# Patient Record
Sex: FEMALE | Race: WHITE
[De-identification: names, ages, dates, MRNs, and addresses within clinical notes are randomized per-mention and may not be internally consistent; named-entity substitution may affect disease eponyms.]

---

## 2019-07-12 ENCOUNTER — HOSPITAL ENCOUNTER (OUTPATIENT)
Dept: HOSPITAL 88 - OR | Age: 44
Discharge: HOME | End: 2019-07-12
Attending: INTERNAL MEDICINE
Payer: COMMERCIAL

## 2019-07-12 VITALS — SYSTOLIC BLOOD PRESSURE: 102 MMHG | DIASTOLIC BLOOD PRESSURE: 68 MMHG

## 2019-07-12 DIAGNOSIS — K63.5: ICD-10-CM

## 2019-07-12 DIAGNOSIS — K29.50: Primary | ICD-10-CM

## 2019-07-12 DIAGNOSIS — K20.9: ICD-10-CM

## 2019-07-12 DIAGNOSIS — F41.9: ICD-10-CM

## 2019-07-12 DIAGNOSIS — Z88.1: ICD-10-CM

## 2019-07-12 DIAGNOSIS — K62.89: ICD-10-CM

## 2019-07-12 DIAGNOSIS — K64.8: ICD-10-CM

## 2019-07-12 DIAGNOSIS — K51.50: ICD-10-CM

## 2019-07-12 DIAGNOSIS — K21.9: ICD-10-CM

## 2019-07-12 LAB — LACTOFERRIN STL QL: NEGATIVE

## 2019-07-12 PROCEDURE — 45380 COLONOSCOPY AND BIOPSY: CPT

## 2019-07-12 PROCEDURE — 87328 CRYPTOSPORIDIUM AG IA: CPT

## 2019-07-12 PROCEDURE — 87045 FECES CULTURE AEROBIC BACT: CPT

## 2019-07-12 PROCEDURE — 87177 OVA AND PARASITES SMEARS: CPT

## 2019-07-12 PROCEDURE — 43239 EGD BIOPSY SINGLE/MULTIPLE: CPT

## 2019-07-12 PROCEDURE — 84702 CHORIONIC GONADOTROPIN TEST: CPT

## 2019-07-12 PROCEDURE — 36415 COLL VENOUS BLD VENIPUNCTURE: CPT

## 2019-07-12 PROCEDURE — 83993 ASSAY FOR CALPROTECTIN FECAL: CPT

## 2019-07-12 PROCEDURE — 83630 LACTOFERRIN FECAL (QUAL): CPT

## 2019-07-12 PROCEDURE — 87493 C DIFF AMPLIFIED PROBE: CPT

## 2019-07-12 NOTE — XMS REPORT
Clinical Summary

                             Created on: 2019



Tiff Mcclellan

External Reference #: [id number]

: 1975

Sex: Female



Demographics







                          Address                   [address] [place] [address]  [address]

 

                          Home Phone                [phone]

 

                          Preferred Language        English

 

                          Marital Status            

 

                          Zoroastrian Affiliation     Unknown

 

                          Race                      White

 

                          Ethnic Group              Non-





Author







                          Author                    [facility]

 

                          Organization              [facility]

 

                          Address                   Unknown

 

                          Phone                     Unavailable







Support







                Name            Relationship    Address         Phone

 

                Contact,No      [facility]            Unknown         [phone]

 

                Scott Villavicencio    [facility]            Unknown         [phone]







Care Team Providers







                    Care Team Member Name    Role                Phone

 

                    Asked, No Pcp       PCP                 Unavailable







Allergies







                                        Comments



                 Active Allergy     Reactions       Severity        Noted Date 

 

                                         



                     Penicillins         Hives               2018 

 

                                         



                     Ceftriaxone         Hives               2018 







Medications







                          End Date                  Status



              Medication     Sig          Dispensed     Refills      Start  



                                         Date  

 

                                                    Active



                 dicyclomine (BENTYL) 20     TK 1 T PO TID      1                 



                           mg tablet                 8  

 

                                                    Active



                 pantoprazole (PROTONIX)     TK 1 T PO QAM      2                 



                           40 MG EC tablet           8  

 

                                                    Active



                     mesalamine (LIALDA) 1.2     Take 1,200 mg       0   



                           gram EC tablet            by mouth     



                                         daily with     



                                         breakfast.     

 

                          2019                Active



              ibuprofen (ADVIL,MOTRIN)     Take 1 tablet     30 tablet     0              



                     600 MG tablet       (600 mg             9  



                                         total) by     



                                         mouth every 8     



                                         (eight) hours     



                                         as needed for     



                                         moderate pain     



                                         for up to 30     



                                         days.     

 

                          2019                Active



              cyclobenzaprine     Take 1 tablet     20 tablet     0              



                     (FLEXERIL) 5 mg tablet     (5 mg total)        9  



                                         by mouth 2     



                                         (two) times a     



                                         day as needed     



                                         for muscle     



                                         spasms for up     



                                         to 30 days.     







Active Problems





Not on file



Encounters







                          Care Team                 Description



                     Date                Type                Specialty  

 

                                        



David Black Jr., MD    Acute left-sided low back pain without sciatica

 (Primary Dx)



                     2019          Emergency           Emergency Medicine  



                                         -    



                                         2019    



after 2018



Family History







   



                 Medical History     Relation        Name            Comments

 

   



                           Heart disease             Mother  









   



                 Relation        Name            Status          Comments

 

   



                           Father                    Alive 

 

   



                           Mother                     







Social History







                                        Date



                 Tobacco Use     Types           Packs/Day       Years Used 

 

                                         



                                         Never Smoker    

 

    



                                         Smokeless Tobacco: Never   



                                         Used   









   



                 Alcohol Use     Drinks/Week     oz/Week         Comments

 

   



                                         Yes   









 



                           Sex Assigned at Birth     Date Recorded

 

 



                                         Not on file 









                                        Industry



                           Job Start Date            Occupation 

 

                                        Not on file



                           Not on file               Not on file 









                                        Travel End



                           Travel History            Travel Start 

 





                                         No recent travel history available.







Last Filed Vital Signs







                                        Time Taken



                           Vital Sign                Reading 

 

                                        2019  2:00 AM CDT



                           Blood Pressure            100/53 

 

                                        2019  2:00 AM CDT



                           Pulse                     55 

 

                                        2019  2:00 AM CDT



                           Temperature               36.6 C (97.8 F) 

 

                                        2019  2:00 AM CDT



                           Respiratory Rate          16 

 

                                        2019  2:00 AM CDT



                           Oxygen Saturation         94% 

 

                                        -



                           Inhaled Oxygen            - 



                                         Concentration  

 

                                        2019  6:21 PM CDT



                           Weight                    65.8 kg (145 lb) 

 

                                        2019  6:21 PM CDT



                           Height                    149.9 cm (4' 11") 

 

                                        2019  6:21 PM CDT



                           Body Mass Index           29.29 







Plan of Treatment







   



                 Health Maintenance     Due Date        Last Done       Comments

 

   



                           INFLUENZA VACCINE         2019  







Procedures







                                        Comments



                 Procedure Name     Priority        Date/Time       Associated Diagnosis 

 

                                        



Results for this procedure are in the results section.



                     CT ABDOMEN PELVIS W     STAT                2019  



                           CONTRAST                  9:14 PM CDT  

 

                                        



Results for this procedure are in the results section.



                     CT LUMBAR SPINE WO     STAT                2019  



                           CONTRAST                  9:14 PM CDT  

 

                                        



Results for this procedure are in the results section.



                     ESTIMATED GFR       STAT                2019  



                                         7:55 PM CDT  

 

                                        



Results for this procedure are in the results section.



                     LACTIC ACID LEVEL     Routine             2019  



                                         7:55 PM CDT  

 

                                        



Results for this procedure are in the results section.



                     LIPASE LEVEL        STAT                2019  



                                         7:55 PM CDT  

 

                                        



Results for this procedure are in the results section.



                     COMPREHENSIVE METABOLIC     STAT                2019  



                           PANEL                     7:55 PM CDT  

 

                                        



Results for this procedure are in the results section.



                     HC COMPLETE BLD COUNT     STAT                2019  



                           W/AUTO DIFF               7:55 PM CDT  

 

                                        



Results for this procedure are in the results section.



                     HCG QUALITATIVE, URINE     Routine             2019  



                           SCREEN                    7:07 PM CDT  

 

                                        



Results for this procedure are in the results section.



                     URINALYSIS SCREEN AND     Routine             2019  



                           MICROSCOPY, WITH REFLEX      7:07 PM CDT  



                                         TO CULTURE    



after 2018



Results

* CT Abdomen Pelvis W Contrast (2019  9:14 PM CDT)









                                         Specimen

 











 



                           Narrative                 Performed At

 

 



                           CT ABDOMEN PELVIS W CONTRAST     [facility]



                                         CLINICAL INDICATION: abdominal painL flank pain 



                                         TECHNIQUE:Multidetector CT imaging of the abdomen and pelvis was performed 





                                         following the intravenous administration of iodinated contrast with multiplanar

 



                                         reconstructions.CT imaging was performed with iterative reconstruction 



                                         technique and/or 



                                         automated exposure control to reduce radiation dose. 



                                         COMPARISON:None 



                                         FINDINGS: 



                                         LOWER THORAX:Clear. 



                                         LIVER:Normal. 



                                         BILIARY:Normal. 



                                         SPLEEN:Normal. 



                                         PANCREAS:Normal. 



                                         ADRENALS:Normal. 



                                         KIDNEYS:No mass or hydronephrosis. 



                                         GI:Large and small bowel are normal in caliber.There are no inflammatory

 



                                         changes.Appendix is visualized and appears normal. 



                                         VASCULAR:Unremarkable 



                                         LYMPH NODES:No enlarged lymph nodes in the abdomen or pelvis. 



                                         PELVIS:The urinary bladder is normal for degree of distention. The uterus is

 



                                         without abnormality.There is a 1.3 cm corpus luteal cyst in the left ovary.

 



                                         BONES:There are no acute osseous abnormalities. 



                                         OTHER:No ascites or pneumoperitoneum. 



                                         IMPRESSION: 



                                         No acute intra-abdominal abnormality is identified. 



                                         [id number] 









                                        Procedure Note

 

                                        



 Interface, Radiology Results  - 2019  9:25 PM CDT



CT ABDOMEN PELVIS W CONTRAST



CLINICAL INDICATION: abdominal pain  L flank pain



TECHNIQUE:  Multidetector CT imaging of the abdomen and pelvis was performed 
following the intravenous administration of iodinated contrast with multiplanar 
reconstructions.  CT imaging was performed with iterative reconstruction 
technique and/or 

automated exposure control to reduce radiation dose.



COMPARISON:  None



FINDINGS:



LOWER THORAX:  Clear.



LIVER:    Normal.



BILIARY:  Normal.

 

SPLEEN:  Normal.



PANCREAS:  Normal.



ADRENALS:  Normal. 



KIDNEYS:  No mass or hydronephrosis.



GI:  Large and small bowel are normal in caliber.  There are no inflammatory 
changes.  Appendix is visualized and appears normal.



VASCULAR:  Unremarkable



LYMPH NODES:  No enlarged lymph nodes in the abdomen or pelvis.



PELVIS:  The urinary bladder is normal for degree of distention. The uterus is 
without abnormality.  There is a 1.3 cm corpus luteal cyst in the left ovary.



BONES:  There are no acute osseous abnormalities.



OTHER:  No ascites or pneumoperitoneum.





IMPRESSION:



No acute intra-abdominal abnormality is identified.









[facility]-[id number]











   



                 Performing Organization     Address         City/State/Zipcode     Phone Number

 

   



                     [facility]          [address] [place] [address] 





* CT Lumbar Spine Wo Contrast (2019  9:14 PM CDT)









                                         Specimen

 











 



                           Narrative                 Performed At

 

 



                           EXAMINATION:CT LUMBAR SPINE WO CONTRAST      [facility]



                                         CLINICAL HISTORY:low back pain. 



                                         COMPARISON: None. 



                                         FINDINGS:: Noncontrast CT of the lumbar spine is interpreted. 



                                         CT imaging was performed with iterative reconstruction techniques and/or 



                                         automated exposure control to reduce radiation dose. 



                                         Lowest fully formed disc space is designated L5-S1. 



                                         The vertebral heights are preserved. No fracture or aggressive bone lesion is 



                                         seen. 



                                         L1-2: Unremarkable. 



                                         L2-3: Unremarkable. 



                                         L3-4: Unremarkable. 



                                         L4-5: Unremarkable. 



                                         L5-S1: Small Schmorl's node in the superior endplate of S1. Minimal disc bulge.

 



                                         No significant stenosis. 



                                         The paraspinous soft tissues are unremarkable. 



                                         IMPRESSION: 



                                         No acute abnormality of the lumbar spine is identified. 



                                         [facility]-[id number] 









                                        Procedure Note

 

                                        



Hm Interface, Radiology Results Incoming - 2019  9:22 PM CDT



EXAMINATION:  CT LUMBAR SPINE WO CONTRAST



CLINICAL HISTORY:  low back pain.







COMPARISON: None.



FINDINGS:: Noncontrast CT of the lumbar spine is interpreted.



CT imaging was performed with iterative reconstruction techniques and/or 
automated exposure control to reduce radiation dose.



Lowest fully formed disc space is designated L5-S1.



The vertebral heights are preserved. No fracture or aggressive bone lesion is 
seen.



L1-2: Unremarkable.



L2-3: Unremarkable.



L3-4: Unremarkable.



L4-5: Unremarkable.



L5-S1: Small Schmorl's node in the superior endplate of S1. Minimal disc bulge. 
No significant stenosis.



The paraspinous soft tissues are unremarkable.



IMPRESSION:



No acute abnormality of the lumbar spine is identified.











[facility]-[id number]









   



                 Performing Organization     Address         City/[facility]/Zipcode     Phone Number

 

   



                     [facility]          [address] [place] [address] 





* Estimated GFR (2019  7:55 PM CDT)





    



              Component     Value        Ref Range     Performed At     Pathologist



                                         Signature

 

    



                 Estimated GFR     >=90            mL/min/1.73 m2     [facility] 



                           Comment:                  [facility] 



                           [facility] 



                                         G1   



                                         >=90 Normal or high   



                                         G2   



                                         60-89Mildly decreased   



                                         [id number]   



                                         Mildly to moderately   



                                         decreased   



                                         J3s29-90   



                                         Moderately to severely   



                                         decreased   



                                         G4   



                                         15-29Severely   



                                         decreased   



                                         G5   



                                         <15Kidney failure   



                                         The eGFR was calculated using   



                                         the Chronic Kidney Disease   



                                         Epidemiology Collaboration   



                                         (CKD-EPI) equation.   



                                         Interpretation is based on   



                                         recommendations of the   



                                         National Kidney   



                                         Foundation-Kidney Disease   



                                         Outcomes Quality   



                                         Initiative (NKF-KDOQI)   



                                         published in 2014.   













                                         Specimen

 





                                         Plasma specimen









   



                 Performing Organization     Address         City/State/Zipcode     Phone Number

 

   



                     [address] [place] [place] [address] 



                                         PATHOLOGY AND GENOMIC   



                                         MEDICINE   

 

   



                     [facility]     [address]   





* CBC with platelet and differential (2019  7:55 PM CDT)





    



              Component     Value        Ref Range     Performed At     Pathologist



                                         Signature

 

    



                 WBC             8.7             4.2 - 11.0 k/uL     [facility] 

 

    



                 RBC             4.45            4.04 - 5.86 m/uL     [facility] 

 

    



                 HGB             13.9            11.5 - 15.3 g/dL     [facility] 

 

    



                 HCT             41.6            34.0 - 45.0 %     [facility] 

 

    



                 MCV             93.5            80.0 - 98.0 fL     [facility] 

 

    



                 MCH             31.2            27.0 - 34.0 pg     [facility] 

 

    



                 MCHC            33.4            31.5 - 36.5 g/dL     [facility] 

 

    



                 RDW - SD        42.6            37.0 - 51.0 fL     [facility] 

 

    



                 MPV             10.0            7.4 - 10.4 fL     [facility] 

 

    



                 Platelet count     309             150 - 400 k/uL     [facility] 

 

    



                 Nucleated RBC     0.00            /100 WBC        [facility] 

 

    



                 Neutrophils     60.1            36.0 - 66.0 %     [facility] 

 

    



                 Lymphocytes     30.3            24.0 - 44.0 %     [facility] 

 

    



                 Monocytes       6.6 (H)         0.0 - 6.0 %     [facility] 

 

    



                 Eosinophils     2.2             0.0 - 6.0 %     [facility] 

 

    



                 Basophils       0.5             0.0 - 1.2 %     [facility] 

 

    



                 Immature        0.3             0.0 - 1.0 %     [facility] 



                           granulocytes              [facility] 













                                         Specimen

 





                                         Blood









   



                 Performing Organization     Address         City/State/Zipcode     Phone Number

 

   



                     [facility]     [address] [place] [address] 



                                         PATHOLOGY AND GENOMIC   



                                         MEDICINE   

 

   



                     [facility]     [id number] [place] [address]   





* Lipase level (2019  7:55 PM CDT)





    



              Component     Value        Ref Range     Performed At     Pathologist



                                         Signature

 

    



                 Lipase          23              13 - 60 U/L     [facility] 













                                         Specimen

 





                                         Plasma specimen









   



                 Performing Organization     Address         City/[facility]     Phone Number

 

   



                     [facility] DEPARTMENT OF     [address] [place] [address] 



                                         PATHOLOGY AND GENOMIC   



                                         MEDICINE   

 

   



                     [address]   





* Lactic acid level (2019  7:55 PM CDT)





    



              Component     Value        Ref Range     Performed At     Pathologist



                                         Signature

 

    



                 Lactic acid     1.3             0.5 - 2.2 mmol/L     [facility] 













                                         Specimen

 





                                         Blood









   



                 Performing Organization     Address         City/[facility] [place]     Phone Number

 

   



                     [facility]     [address] [place] [address] 



                                         PATHOLOGY AND GENOMIC   



                                         MEDICINE   

 

   



                     [address]   





* Comprehensive metabolic panel (2019  7:55 PM CDT)





    



              Component     Value        Ref Range     Performed At     Pathologist



                                         Signature

 

    



                 Sodium          141             135 - 150 mEq/L     [facility] 

 

    



                 Potassium       3.8             3.5 - 5.0 mEq/L     [facility] 

 

    



                 Chloride        103             98 - 112 mEq/L     [facility] 

 

    



                 CO2             27              24 - 31 mmol/L     [facility] 

 

    



                 Anion gap       11@ANIO         7 - 15 mEq/L     [facility] 

 

    



                 BUN             15              7 - 18 mg/dL     [facility] 

 

    



                 Creatinine      0.80            0.50 - 0.90 mg/dL     [facility] 

 

    



                 Glucose         93              65 - 100 mg/dL     [facility] 

 

    



                 Calcium         9.7             8.3 - 10.2 mg/dL     [facility] 

 

    



                 Protein         8.3             6.3 - 8.3 g/dL     [facility] 

 

    



                 Albumin         4.0             3.5 - 5.0 g/dL     [facility] 

 

    



                 A/G ratio       0.9             0.7 - 3.8       [facility] 

 

    



                 Alkaline        114 (H)         0 - 104 U/L     [facility] 



                           phosphatase               [facility] 

 

    



                 AST             26              10 - 35 U/L     [facility] 

 

    



                 ALT             22              5 - 50 U/L      [facility] 

 

    



                 Total bilirubin     0.3             0.2 - 1.2 mg/dL     [facility] 













                                         Specimen

 





                                         Plasma specimen









   



                 Performing Organization     Address         City/State/Zipcode     Phone Number

 

   



                     [facility] DEPARTMENT OF     [address] [place] [address] 



                                         PATHOLOGY AND GENOMIC   



                                         MEDICINE   

 

   



                     [facility]     [address] [place] [address] 



                                         HOSPITAL   





* Urinalysis screen and microscopy, with reflex to culture (2019  7:07 PM 
  CDT)





    



              Component     Value        Ref Range     Performed At     Pathologist



                                         Signature

 

    



                     Specimen site       Clean catch         [facility] 

 

    



                     Color, UA           Yellow              [facility] 

 

    



                     Appearance, UA      Clear               [facility] 

 

    



                 Specific        1.025           1.001 - 1.035     [facility] 



                           gravity, [facility] 

 

    



                 pH, UA          7.0             5.0 - 8.5       [facility] 

 

    



                 Protein, UA     Negative        Negative        [facility] 

 

    



                 Glucose, UA     Negative        Negative        [facility] 

 

    



                 Ketones, UA     Negative        Negative        [facility] 

 

    



                 Bilirubin, UA     Negative        Negative        [facility] 

 

    



                 Blood, UA       Negative        Negative        [facility] 

 

    



                 Nitrite, UA     Negative        Negative        [facility] 

 

    



                 Urobilinogen,     Negative        <2.0            [facility] 

 

    



                 Leukocyte       Negative        Negative        [facility] 



                           esterase, [facility] 

 

    



                 Epithelial      Moderate        /HPF            [facility] 



                           cells, [facility] 

 

    



                 WBC, UA         2               0 - 5 /HPF      [facility] 

 

    



                 RBC, UA         2               0 - 5 /HPF      [facility] 

 

    



                 Bacteria, UA     Trace           None seen       [facility] 

 

    



                     Yeast, UA           None seen           [facility] 

 

    



                     Yeast with          None seen           [facility] 



                           pseudohyphae,             [facility] 













                                         Specimen

 





                                         Urine









   



                 Performing Organization     Address         City/State/Zipcode     Phone Number

 

   



                     [facility] DEPARTMENT OF     [address] [place] [address] 



                                         PATHOLOGY AND GENOMIC   



                                         MEDICINE   

 

   



                     [facility]     [address]      [address]   





* hCG qualitative, urine screen (2019  7:07 PM CDT)





    



              Component     Value        Ref Range     Performed At     Pathologist



                                         Signature

 

    



                 hCG             Negative        Negative        [facility] 



                     qualitative,        Comment:            [facility] 



                     urine               The manufacturers stated      [facility] 



                           sensitivity of HcG test for      HOSPITAL 



                                         serum is >/=10   



                                         mIU/ml and urine is   



                                         >/=20mIU/ml.   













                                         Specimen

 





                                         Urine









   



                 Performing Organization     Address         City/State/Zipcode     Phone Number

 

   



                     [facility] DEPARTMENT OF     [address] [place] [address] 



                                         PATHOLOGY AND GENOMIC   



                                         MEDICINE   

 

   



                     [facility]     [address] [place] [address] 



                                         HOSPITAL   





after 2018



Insurance







                                        Type



            Payer      Benefit     Subscriber ID     Effective     Phone      Address 



                           Plan /                    Dates   



                                         Group     

 

                                        PPO



                 BCBS            BCBS            xxxxxxxxxxxx     3/3/2019-P   



                           CHOICE                    resent   



                                         PPO/[employer] PPO     









     



            Guarantor Name     Account     Relation to     Date of     Phone      Billing Address



                     Type                Patient             Birth  

 

     



            Tiff Mcclellan     Personal/F     Self       1975     [phone] [address]               ([place])              [place] [address]







Advance Directives





Patient has advance care planning documents on file. For more information, enoch
e contact:



Uriel Steen



[address] [place] [address]

## 2019-07-13 LAB — C DIFFICILE TOXIN A&B AMP PROB: NEGATIVE

## 2019-07-13 NOTE — OPERATIVE REPORT
DATE OF PROCEDURE:  07/12/2019

 

SURGEON:  Edgardo Tanner MD

 

PROCEDURE:  EGD with biopsies, colonoscopy with polypectomy and biopsies.

 

INDICATION FOR EGD:  Heartburn, bloating.

 

INDICATIONS FOR COLONOSCOPY:  Diarrhea, recurrent.

 

MEDICATIONS:  The patient was done under MAC, please see anesthesiologist's note.

 

PROCEDURE IN DETAIL:  With the patient in left lateral decubitus position, a flexible

fiberoptic Olympus gastroscope was introduced into the esophagus under direct

visualization without any difficulty.  There was some patchy erythema noted in distal

esophagus.  The scope was then advanced with ease into the stomach and mucosa overlying

the antrum and the body revealed some patchy erythema and low-grade to moderate edema

and biopsies were obtained and sent to stain for H. pylori.  The pylorus was of normal

contour and shape, was intubated with ease and the scope was advanced all the way to the

second portion of the duodenum.  Biopsies were obtained from the second portion and

duodenal bulb to rule out sprue.  The scope was then withdrawn back into the stomach and

retroflexed mucosa overlying the fundus and cardia appeared to be within normal limits.

The scope was then straightened out, it was subsequently withdrawn.  The patient

tolerated the procedure well. 

 

IMPRESSION:  

1. Distal esophagitis, mild.

2. Gastritis, biopsied, biopsies sent to stain for H. pylori.

3. Rule out sprue.

 

PLAN:  Follow up histology.  Initiate Protonix 40 mg one p.o. q.a.m. a.c.  The patient

was then turned around and after adequate lubrication of the anal canal, a flexible

fiberoptic Olympus colonoscope was inserted into the rectum with ease and advanced all

the way to the cecum.  Mucosa overlying the cecum appeared to be within normal limits.

The ileocecal valve was intubated and the scope was advanced into the terminal ileum.

Biopsies were obtained.  The scope was then withdrawn back into the colon.  It was then

withdrawn slowly, mucosa overlying the ascending transverse appeared to be within normal

limits.  Mild patchy inflammatory changes were noted in the left colon.  Random biopsies

were obtained.  One minute polyp was noted in the sigmoid colon that was excised with

the cold biopsy forceps.  Similar inflammatory changes were noted in the rectum and

biopsies were obtained.  The scope was then retroflexed into the distal rectum.  Small

internal hemorrhoids were noted, none of which was actively bleeding.  The scope was

then straightened out, it was subsequently withdrawn, after securing an adequate stool

specimen that was sent for the appropriate stool studies.  The patient tolerated the

procedure well. 

 

IMPRESSION:  

1. Mild patchy left-sided colitis.

2. Sigmoid colon polyp excised with the cold biopsy forceps.

3. Proctitis, mild.

4. Internal hemorrhoids.

 

PLAN:  Follow up histology.  Follow up stool studies.  Start Bentyl 10 mg one p.o.

t.i.d.  The patient might benefit from a followup colonoscopy in 5-10 years. 

 

 

 

 

______________________________

Edgardo Tanner MD

 

[facility]

D:  07/12/2019 17:14:45

T:  07/13/2019 00:32:20

Job #:  [id number]

 

cc:            Gallo Dee DO

## 2019-08-01 ENCOUNTER — HOSPITAL ENCOUNTER (EMERGENCY)
Dept: HOSPITAL 88 - ER | Age: 44
Discharge: HOME | End: 2019-08-01
Payer: COMMERCIAL

## 2019-08-01 VITALS — HEIGHT: 72 IN | WEIGHT: 144 LBS | BODY MASS INDEX: 19.5 KG/M2

## 2019-08-01 DIAGNOSIS — K51.80: Primary | ICD-10-CM

## 2019-08-01 DIAGNOSIS — K62.5: ICD-10-CM

## 2019-08-01 LAB
ALBUMIN SERPL-MCNC: 3.9 G/DL (ref 3.5–5)
ALBUMIN/GLOB SERPL: 1.1 {RATIO} (ref 0.8–2)
ALP SERPL-CCNC: 92 IU/L (ref 40–150)
ALT SERPL-CCNC: 19 IU/L (ref 0–55)
ANION GAP SERPL CALC-SCNC: 15.1 MMOL/L (ref 8–16)
BACTERIA URNS QL MICRO: (no result) /HPF
BASOPHILS # BLD AUTO: 0 10*3/UL (ref 0–0.1)
BASOPHILS NFR BLD AUTO: 0.5 % (ref 0–1)
BILIRUB UR QL: NEGATIVE
BUN SERPL-MCNC: 9 MG/DL (ref 7–26)
BUN/CREAT SERPL: 12 (ref 6–25)
CALCIUM SERPL-MCNC: 9.2 MG/DL (ref 8.4–10.2)
CHLORIDE SERPL-SCNC: 103 MMOL/L (ref 98–107)
CK MB SERPL-MCNC: 1.2 NG/ML (ref 0–5)
CK SERPL-CCNC: 58 IU/L (ref 29–168)
CLARITY UR: (no result)
CO2 SERPL-SCNC: 24 MMOL/L (ref 22–29)
COLOR UR: YELLOW
DEPRECATED APTT PLAS QN: 31.2 SECONDS (ref 23.8–35.5)
DEPRECATED INR PLAS: 0.92
DEPRECATED NEUTROPHILS # BLD AUTO: 4.9 10*3/UL (ref 2.1–6.9)
DEPRECATED RBC URNS MANUAL-ACNC: (no result) /HPF (ref 0–5)
EGFRCR SERPLBLD CKD-EPI 2021: > 60 ML/MIN (ref 60–?)
EOSINOPHIL # BLD AUTO: 0.1 10*3/UL (ref 0–0.4)
EOSINOPHIL NFR BLD AUTO: 1.4 % (ref 0–6)
EPI CELLS URNS QL MICRO: (no result) /LPF
ERYTHROCYTE [DISTWIDTH] IN CORD BLOOD: 12.3 % (ref 11.7–14.4)
GLOBULIN PLAS-MCNC: 3.6 G/DL (ref 2.3–3.5)
GLUCOSE SERPLBLD-MCNC: 84 MG/DL (ref 74–118)
HCT VFR BLD AUTO: 35.5 % (ref 34.2–44.1)
HGB BLD-MCNC: 12.7 G/DL (ref 12–16)
KETONES UR QL STRIP.AUTO: NEGATIVE
LEUKOCYTE ESTERASE UR QL STRIP.AUTO: NEGATIVE
LYMPHOCYTES # BLD: 2.6 10*3/UL (ref 1–3.2)
LYMPHOCYTES NFR BLD AUTO: 30.7 % (ref 18–39.1)
MCH RBC QN AUTO: 32 PG (ref 28–32)
MCHC RBC AUTO-ENTMCNC: 35.8 G/DL (ref 31–35)
MCV RBC AUTO: 89.4 FL (ref 81–99)
MONOCYTES # BLD AUTO: 0.7 10*3/UL (ref 0.2–0.8)
MONOCYTES NFR BLD AUTO: 8.4 % (ref 4.4–11.3)
NEUTS SEG NFR BLD AUTO: 58.6 % (ref 38.7–80)
NITRITE UR QL STRIP.AUTO: NEGATIVE
PLATELET # BLD AUTO: 316 X10E3/UL (ref 140–360)
POTASSIUM SERPL-SCNC: 3.1 MMOL/L (ref 3.5–5.1)
PROT UR QL STRIP.AUTO: NEGATIVE
PROTHROMBIN TIME: 12.8 SECONDS (ref 11.9–14.5)
RBC # BLD AUTO: 3.97 X10E6/UL (ref 3.6–5.1)
SODIUM SERPL-SCNC: 139 MMOL/L (ref 136–145)
SP GR UR STRIP: 1.01 (ref 1.01–1.02)
UROBILINOGEN UR STRIP-MCNC: 0.2 MG/DL (ref 0.2–1)
WBC #/AREA URNS HPF: (no result) /HPF (ref 0–5)

## 2019-08-01 PROCEDURE — 87086 URINE CULTURE/COLONY COUNT: CPT

## 2019-08-01 PROCEDURE — 85025 COMPLETE CBC W/AUTO DIFF WBC: CPT

## 2019-08-01 PROCEDURE — 84484 ASSAY OF TROPONIN QUANT: CPT

## 2019-08-01 PROCEDURE — 82553 CREATINE MB FRACTION: CPT

## 2019-08-01 PROCEDURE — 81001 URINALYSIS AUTO W/SCOPE: CPT

## 2019-08-01 PROCEDURE — 99284 EMERGENCY DEPT VISIT MOD MDM: CPT

## 2019-08-01 PROCEDURE — 36415 COLL VENOUS BLD VENIPUNCTURE: CPT

## 2019-08-01 PROCEDURE — 85610 PROTHROMBIN TIME: CPT

## 2019-08-01 PROCEDURE — 85730 THROMBOPLASTIN TIME PARTIAL: CPT

## 2019-08-01 PROCEDURE — 80053 COMPREHEN METABOLIC PANEL: CPT

## 2019-08-01 PROCEDURE — 82550 ASSAY OF CK (CPK): CPT

## 2019-08-01 PROCEDURE — 74177 CT ABD & PELVIS W/CONTRAST: CPT

## 2019-08-01 PROCEDURE — 82270 OCCULT BLOOD FECES: CPT

## 2019-08-01 RX ADMIN — Medication ONE MG: at 17:43

## 2019-08-01 RX ADMIN — Medication ONE MG: at 17:42

## 2019-08-01 NOTE — NUR
PT STATES MORPHINE MAKES HER FEEL BAD AND THEY USUALLY GIVE HER SOMETHING LESS 
STRONG. C/O RECTAL BLEED CONTRAINDICATES TORADOL PER MD.

## 2019-08-01 NOTE — XMS REPORT
Clinical Summary

                             Created on: 2019



Eleuterio Tiff

External Reference #: [id number]

: 1975

Sex: Female



Demographics







                          Address                   [address] [place], [place] [address]

 

                          Home Phone                [phone]

 

                          Preferred Language        English

 

                          Marital Status            

 

                          Sabianism Affiliation     Unknown

 

                          Race                      White

 

                          Ethnic Group              Non-





Author







                          Author                    [facility]

 

                          Organization              [facility]

 

                          Address                   Unknown

 

                          Phone                     Unavailable







Support







                Name            Relationship    Address         Phone

 

                Contact,No      [employer]            Unknown         [phone]

 

                Scott Villavicencio    [employer]            Unknown         [phone]







Care Team Providers







                    Care Team Member Name    Role                Phone

 

                    Asked, No Pcp       PCP                 Unavailable







Allergies







                                        Comments



                 Active Allergy     Reactions       Severity        Noted Date 

 

                                         



                     Penicillins         Hives               2018 

 

                                         



                     Ceftriaxone         Hives               2018 







Medications







                          End Date                  Status



              Medication     Sig          Dispensed     Refills      Start  



                                         Date  

 

                                                    Active



                 dicyclomine (BENTYL) 20     TK 1 T PO TID      1                 



                           mg tablet                 8  

 

                                                    Active



                 pantoprazole (PROTONIX)     TK 1 T PO QAM      2                 



                           40 MG EC tablet           8  

 

                                                    Active



                     mesalamine (LIALDA) 1.2     Take 1,200 mg       0   



                           gram EC tablet            by mouth     



                                         daily with     



                                         breakfast.     

 

                          2019                



              ibuprofen (ADVIL,MOTRIN)     Take 1 tablet     30 tablet     0              



                     600 MG tablet       (600 mg             9  



                                         total) by     



                                         mouth every 8     



                                         (eight) hours     



                                         as needed for     



                                         moderate pain     



                                         for up to 30     



                                         days.     

 

                          2019                



              cyclobenzaprine     Take 1 tablet     20 tablet     0              



                     (FLEXERIL) 5 mg tablet     (5 mg total)        9  



                                         by mouth 2     



                                         (two) times a     



                                         day as needed     



                                         for muscle     



                                         spasms for up     



                                         to 30 days.     







Active Problems





Not on file



Encounters







                          Care Team                 Description



                     Date                Type                Specialty  

 

                                        



David Black Jr., MD    Acute left-sided low back pain without sciatica

 (Primary Dx)



                     2019          Emergency           Emergency Medicine  



                                         -    



                                         2019    



after 2018



Family History







   



                 Medical History     Relation        Name            Comments

 

   



                           Heart disease             Mother  









   



                 Relation        Name            Status          Comments

 

   



                           Father                    Alive 

 

   



                           Mother                     







Social History







                                        Date



                 Tobacco Use     Types           Packs/Day       Years Used 

 

                                         



                                         Never Smoker    

 

    



                                         Smokeless Tobacco: Never   



                                         Used   









   



                 Alcohol Use     Drinks/Week     oz/Week         Comments

 

   



                                         Yes   









 



                           Sex Assigned at Birth     Date Recorded

 

 



                                         Not on file 









                                        Industry



                           Job Start Date            Occupation 

 

                                        Not on file



                           Not on file               Not on file 









                                        Travel End



                           Travel History            Travel Start 

 





                                         No recent travel history available.







Last Filed Vital Signs







                                        Time Taken



                           Vital Sign                Reading 

 

                                        2019  2:00 AM CDT



                           Blood Pressure            100/53 

 

                                        2019  2:00 AM CDT



                           Pulse                     55 

 

                                        2019  2:00 AM CDT



                           Temperature               36.6 C (97.8 F) 

 

                                        2019  2:00 AM CDT



                           Respiratory Rate          16 

 

                                        2019  2:00 AM CDT



                           Oxygen Saturation         94% 

 

                                        -



                           Inhaled Oxygen            - 



                                         Concentration  

 

                                        2019  6:21 PM CDT



                           Weight                    65.8 kg (145 lb) 

 

                                        2019  6:21 PM CDT



                           Height                    149.9 cm (4' 11") 

 

                                        2019  6:21 PM CDT



                           Body Mass Index           29.29 







Plan of Treatment







   



                 Health Maintenance     Due Date        Last Done       Comments

 

   



                           INFLUENZA VACCINE         2019  







Procedures







                                        Comments



                 Procedure Name     Priority        Date/Time       Associated Diagnosis 

 

                                        



Results for this procedure are in the results section.



                     CT ABDOMEN PELVIS W     STAT                2019  



                           CONTRAST                  9:14 PM CDT  

 

                                        



Results for this procedure are in the results section.



                     CT LUMBAR SPINE WO     STAT                2019  



                           CONTRAST                  9:14 PM CDT  

 

                                        



Results for this procedure are in the results section.



                     ESTIMATED GFR       STAT                2019  



                                         7:55 PM CDT  

 

                                        



Results for this procedure are in the results section.



                     LACTIC ACID LEVEL     Routine             2019  



                                         7:55 PM CDT  

 

                                        



Results for this procedure are in the results section.



                     LIPASE LEVEL        STAT                2019  



                                         7:55 PM CDT  

 

                                        



Results for this procedure are in the results section.



                     COMPREHENSIVE METABOLIC     STAT                2019  



                           PANEL                     7:55 PM CDT  

 

                                        



Results for this procedure are in the results section.



                     HC COMPLETE BLD COUNT     STAT                2019  



                           W/AUTO DIFF               7:55 PM CDT  

 

                                        



Results for this procedure are in the results section.



                     HCG QUALITATIVE, URINE     Routine             2019  



                           SCREEN                    7:07 PM CDT  

 

                                        



Results for this procedure are in the results section.



                     URINALYSIS SCREEN AND     Routine             2019  



                           MICROSCOPY, WITH REFLEX      7:07 PM CDT  



                                         TO CULTURE    



after 2018



Results

* CT Abdomen Pelvis W Contrast (2019  9:14 PM CDT)









                                         Specimen

 











 



                           Narrative                 Performed At

 

 



                           CT ABDOMEN PELVIS W CONTRAST     [facility]



                                         CLINICAL INDICATION: abdominal painL flank pain 



                                         TECHNIQUE:Multidetector CT imaging of the abdomen and pelvis was performed 





                                         following the intravenous administration of iodinated contrast with multiplanar

 



                                         reconstructions.CT imaging was performed with iterative reconstruction 



                                         technique and/or 



                                         automated exposure control to reduce radiation dose. 



                                         COMPARISON:None 



                                         FINDINGS: 



                                         LOWER THORAX:Clear. 



                                         LIVER:Normal. 



                                         BILIARY:Normal. 



                                         SPLEEN:Normal. 



                                         PANCREAS:Normal. 



                                         ADRENALS:Normal. 



                                         KIDNEYS:No mass or hydronephrosis. 



                                         GI:Large and small bowel are normal in caliber.There are no inflammatory

 



                                         changes.Appendix is visualized and appears normal. 



                                         VASCULAR:Unremarkable 



                                         LYMPH NODES:No enlarged lymph nodes in the abdomen or pelvis. 



                                         PELVIS:The urinary bladder is normal for degree of distention. The uterus is

 



                                         without abnormality.There is a 1.3 cm corpus luteal cyst in the left ovary.

 



                                         BONES:There are no acute osseous abnormalities. 



                                         OTHER:No ascites or pneumoperitoneum. 



                                         IMPRESSION: 



                                         No acute intra-abdominal abnormality is identified. 



                                         [facility]-[id number] 









                                        Procedure Note

 

                                        



 Interface, Radiology Results  - 2019  9:25 PM CDT



CT ABDOMEN PELVIS W CONTRAST



CLINICAL INDICATION: abdominal pain  L flank pain



TECHNIQUE:  Multidetector CT imaging of the abdomen and pelvis was performed 
following the intravenous administration of iodinated contrast with multiplanar 
reconstructions.  CT imaging was performed with iterative reconstruction 
technique and/or 

automated exposure control to reduce radiation dose.



COMPARISON:  None



FINDINGS:



LOWER THORAX:  Clear.



LIVER:    Normal.



BILIARY:  Normal.

 

SPLEEN:  Normal.



PANCREAS:  Normal.



ADRENALS:  Normal. 



KIDNEYS:  No mass or hydronephrosis.



GI:  Large and small bowel are normal in caliber.  There are no inflammatory 
changes.  Appendix is visualized and appears normal.



VASCULAR:  Unremarkable



LYMPH NODES:  No enlarged lymph nodes in the abdomen or pelvis.



PELVIS:  The urinary bladder is normal for degree of distention. The uterus is 
without abnormality.  There is a 1.3 cm corpus luteal cyst in the left ovary.



BONES:  There are no acute osseous abnormalities.



OTHER:  No ascites or pneumoperitoneum.





IMPRESSION:



No acute intra-abdominal abnormality is identified.









[facility]-[id number]











   



                 Performing Organization     Address         City/State/Zipcode     Phone Number

 

   



                     [facility]          [address] [place] [address] 





* CT Lumbar Spine Wo Contrast (2019  9:14 PM CDT)









                                         Specimen

 











 



                           Narrative                 Performed At

 

 



                           EXAMINATION:CT LUMBAR SPINE WO CONTRAST      [facility]



                                         CLINICAL HISTORY:low back pain. 



                                         COMPARISON: None. 



                                         FINDINGS:: Noncontrast CT of the lumbar spine is interpreted. 



                                         CT imaging was performed with iterative reconstruction techniques and/or 



                                         automated exposure control to reduce radiation dose. 



                                         Lowest fully formed disc space is designated L5-S1. 



                                         The vertebral heights are preserved. No fracture or aggressive bone lesion is 



                                         seen. 



                                         L1-2: Unremarkable. 



                                         L2-3: Unremarkable. 



                                         L3-4: Unremarkable. 



                                         L4-5: Unremarkable. 



                                         L5-S1: Small Schmorl's node in the superior endplate of S1. Minimal disc bulge.

 



                                         No significant stenosis. 



                                         The paraspinous soft tissues are unremarkable. 



                                         IMPRESSION: 



                                         No acute abnormality of the lumbar spine is identified. 



                                         [facility]-[id number] 









                                        Procedure Note

 

                                        



Hm Interface, Radiology Results Incoming - 2019  9:22 PM CDT



EXAMINATION:  CT LUMBAR SPINE WO CONTRAST



CLINICAL HISTORY:  low back pain.







COMPARISON: None.



FINDINGS:: Noncontrast CT of the lumbar spine is interpreted.



CT imaging was performed with iterative reconstruction techniques and/or 
automated exposure control to reduce radiation dose.



Lowest fully formed disc space is designated L5-S1.



The vertebral heights are preserved. No fracture or aggressive bone lesion is 
seen.



L1-2: Unremarkable.



L2-3: Unremarkable.



L3-4: Unremarkable.



L4-5: Unremarkable.



L5-S1: Small Schmorl's node in the superior endplate of S1. Minimal disc bulge. 
No significant stenosis.



The paraspinous soft tissues are unremarkable.



IMPRESSION:



No acute abnormality of the lumbar spine is identified.











[facility]-[id number]









   



                 Performing Organization     Address         City/[facility]/Zipcode     Phone Number

 

   



                     [facility]          [address] [place] [address] 





* Estimated GFR (2019  7:55 PM CDT)





    



              Component     Value        Ref Range     Performed At     Pathologist



                                         Signature

 

    



                 Estimated GFR     >=90            mL/min/1.73 m2     [place] 



                           Comment:                  [employer] 



                           [facility] 



                                         G1   



                                         >=90 Normal or high   



                                         G2   



                                         60-89Mildly decreased   



                                         [id number]   



                                         Mildly to moderately   



                                         decreased   



                                         [id number]   



                                         Moderately to severely   



                                         decreased   



                                         G4   



                                         15-29Severely   



                                         decreased   



                                         G5   



                                         <15Kidney failure   



                                         The eGFR was calculated using   



                                         the Chronic Kidney Disease   



                                         Epidemiology Collaboration   



                                         (CKD-EPI) equation.   



                                         Interpretation is based on   



                                         recommendations of the   



                                         National Kidney   



                                         Foundation-Kidney Disease   



                                         Outcomes Quality   



                                         Initiative (NKF-KDOQI)   



                                         published in 2014.   













                                         Specimen

 





                                         Plasma specimen









   



                 Performing Organization     Address         City/State/Zipcode     Phone Number

 

   



                     [facility] DEPARTMENT OF     [address] [place] [address] 



                                         PATHOLOGY AND GENOMIC   



                                         MEDICINE   

 

   



                     [facility]     [address] [place] [address]   





* CBC with platelet and differential (2019  7:55 PM CDT)





    



              Component     Value        Ref Range     Performed At     Pathologist



                                         Signature

 

    



                 WBC             8.7             4.2 - 11.0 k/uL     [facility] 

 

    



                 RBC             4.45            4.04 - 5.86 m/uL     [facility] 

 

    



                 HGB             13.9            11.5 - 15.3 g/dL     [facility] 

 

    



                 HCT             41.6            34.0 - 45.0 %     [facility] 

 

    



                 MCV             93.5            80.0 - 98.0 fL     [facility] 

 

    



                 MCH             31.2            27.0 - 34.0 pg     [facility] 

 

    



                 MCHC            33.4            31.5 - 36.5 g/dL     [facility] 

 

    



                 RDW - SD        42.6            37.0 - 51.0 fL     [facility] 

 

    



                 MPV             10.0            7.4 - 10.4 fL     [facility] 

 

    



                 Platelet count     309             150 - 400 k/uL     [facility] 

 

    



                 Nucleated RBC     0.00            /100 WBC        [facility] 

 

    



                 Neutrophils     60.1            36.0 - 66.0 %     [facility] 

 

    



                 Lymphocytes     30.3            24.0 - 44.0 %     [facility] 

 

    



                 Monocytes       6.6 (H)         0.0 - 6.0 %     [facility] 

 

    



                 Eosinophils     2.2             0.0 - 6.0 %     [facility] 

 

    



                 Basophils       0.5             0.0 - 1.2 %     [facility] 

 

    



                 Immature        0.3             0.0 - 1.0 %     [place] 



                           granulocytes              [facility] 













                                         Specimen

 





                                         Blood









   



                 Performing Organization     Address         City/State/Zipcode     Phone Number

 

   



                     [facility]     [address] [place] [address] 



                                         PATHOLOGY AND GENOMIC   



                                         MEDICINE   

 

   



                     [facility]     [address] [place], [place] [id number] [place] [address]   





* Lipase level (2019  7:55 PM CDT)





    



              Component     Value        Ref Range     Performed At     Pathologist



                                         Signature

 

    



                 Lipase          23              13 - 60 U/L     [facility] 













                                         Specimen

 





                                         Plasma specimen









   



                 Performing Organization     Address         City[facility]     Phone Number

 

   



                     [facility] DEPARTMENT OF     [address] [place] [address] 



                                         PATHOLOGY AND GENOMIC   



                                         MEDICINE   

 

   



                     [address]   





* Lactic acid level (2019  7:55 PM CDT)





    



              Component     Value        Ref Range     Performed At     Pathologist



                                         Signature

 

    



                 Lactic acid     1.3             0.5 - 2.2 mmol/L     [facility] 













                                         Specimen

 





                                         Blood









   



                 Performing Organization     Address         City/[facility]     Phone Number

 

   



                     [facility] DEPARTMENT OF     [address] [place] [address] 



                                         PATHOLOGY AND GENOMIC   



                                         MEDICINE   

 

   



                     [address]   





* Comprehensive metabolic panel (2019  7:55 PM CDT)





    



              Component     Value        Ref Range     Performed At     Pathologist



                                         Signature

 

    



                 Sodium          141             135 - 150 mEq/L     [facility] 

 

    



                 Potassium       3.8             3.5 - 5.0 mEq/L     [facility] 

 

    



                 Chloride        103             98 - 112 mEq/L     [facility] 

 

    



                 CO2             27              24 - 31 mmol/L     [facility] 

 

    



                 Anion gap       11@ANIO         7 - 15 mEq/L     [facility] 

 

    



                 BUN             15              7 - 18 mg/dL     [facility] 

 

    



                 Creatinine      0.80            0.50 - 0.90 mg/dL     [facility] 

 

    



                 Glucose         93              65 - 100 mg/dL     [facility] 

 

    



                 Calcium         9.7             8.3 - 10.2 mg/dL     [facility] 

 

    



                 Protein         8.3             6.3 - 8.3 g/dL     [facility] 

 

    



                 Albumin         4.0             3.5 - 5.0 g/dL     [facility] 

 

    



                 A/G ratio       0.9             0.7 - 3.8       [facility] 

 

    



                 Alkaline        114 (H)         0 - 104 U/L     [facility] [place] 



                           phosphatase               [facility] 

 

    



                 AST             26              10 - 35 U/L     [facility] 

 

    



                 ALT             22              5 - 50 U/L      [facility] 

 

    



                 Total bilirubin     0.3             0.2 - 1.2 mg/dL     [facility] 













                                         Specimen

 





                                         Plasma specimen









   



                 Performing Organization     Address         City/State/Zipcode     Phone Number

 

   



                     [facility] DEPARTMENT OF     [address] [place] [address] 



                                         PATHOLOGY AND GENOMIC   



                                         MEDICINE   

 

   



                     [facility]     [address] [place] [address] 



                                         HOSPITAL   





* Urinalysis screen and microscopy, with reflex to culture (2019  7:07 PM 
  CDT)





    



              Component     Value        Ref Range     Performed At     Pathologist



                                         Signature

 

    



                     Specimen site       Clean catch         [facility] 

 

    



                     Color, UA           Yellow              [facility] 

 

    



                     Appearance, UA      Clear               [facility] 

 

    



                 Specific        1.025           1.001 - 1.035     [place] 



                           gravity, [facility] 

 

    



                 pH, UA          7.0             5.0 - 8.5       [facility] 

 

    



                 Protein, UA     Negative        Negative        [facility] 

 

    



                 Glucose, UA     Negative        Negative        [facility] 

 

    



                 Ketones, UA     Negative        Negative        [facility] 

 

    



                 Bilirubin, UA     Negative        Negative        [facility] 

 

    



                 Blood, UA       Negative        Negative        [facility] 

 

    



                 Nitrite, UA     Negative        Negative        [facility] 

 

    



                 Urobilinogen,     Negative        <2.0            [facility] 

 

    



                 Leukocyte       Negative        Negative        [place] 



                           esterase, [facility] 

 

    



                 Epithelial      Moderate        /HPF            [place] 



                           cells, [facility] 

 

    



                 WBC, UA         2               0 - 5 /HPF      [facility] 

 

    



                 RBC, UA         2               0 - 5 /HPF      [facility] 

 

    



                 Bacteria, UA     Trace           None seen       [facility] 

 

    



                     Yeast, UA           None seen           [facility] 

 

    



                     Yeast with          None seen           [place] 



                           pseudohyphae,             [facility] 













                                         Specimen

 





                                         Urine









   



                 Performing Organization     Address         City/State/Zipcode     Phone Number

 

   



                     [facility] DEPARTMENT OF     [address] [place] [address] 



                                         PATHOLOGY AND GENOMIC   



                                         MEDICINE   

 

   



                     [facility]     [address]      [address]   





* hCG qualitative, urine screen (2019  7:07 PM CDT)





    



              Component     Value        Ref Range     Performed At     Pathologist



                                         Signature

 

    



                 hCG             Negative        Negative        [facility] 



                     qualitative,        Comment:            [facility] 



                     urine               The manufacturers stated      [place] 



                           sensitivity of HcG test for      HOSPITAL 



                                         serum is >/=10   



                                         mIU/ml and urine is   



                                         >/=20mIU/ml.   













                                         Specimen

 





                                         Urine









   



                 Performing Organization     Address         City/State/Zipcode     Phone Number

 

   



                     [facility]     [address] [place] [address] 



                                         PATHOLOGY AND GENOMIC   



                                         MEDICINE   

 

   



                     [facility]     [address] [place] [address] 



                                         HOSPITAL   





after 2018



Insurance







                                        Type



            Payer      Benefit     Subscriber ID     Effective     Phone      Address 



                           Plan /                    Dates   



                                         Group     

 

                                        PPO



                 BCBS            BCBS            xxxxxxxxxxxx     3/3/2019-P   



                           CHOICE                    resent   



                                         PPO/[employer] PPO     









     



            Guarantor Name     Account     Relation to     Date of     Phone      Billing Address



                     Type                Patient             Birth  

 

     



            Tiff Mcclellan     Personal/F     Self       1975     [phone] [address]               ([place])              [place] [address]







Advance Directives





Patient has advance care planning documents on file. For more information, enoch
e contact:



Uriel Steen



[address] [place] [address]

## 2019-08-01 NOTE — DIAGNOSTIC IMAGING REPORT
EXAM:  CT of the abdomen and pelvis WITH contrast



HISTORY:  Rectal bleeding, stomach pain, left lower quadrant pain, history of

diverticulitis and ulcerative colitis

COMPARISON:  CT of the abdomen and pelvis January 11, 2018.



TECHNIQUE: 

The abdomen and pelvis were scanned utilizing a multidetector helical scanner. 

Coronal and sagittal reformats are provided.

            PROTOCOL:  Routine

            IV CONTRAST:  100 cc of Isovue-370.

            ORAL CONTRAST:  Dilute Gastrografin.

            RADIATION DOSE: Total DLP: 443.52 mGy*cm

                      Estimated effective dose:  (DLP x 0.015 x size factor)

Dose modulation, iterative reconstruction, and/or weight based adjustment of

the mA/kV was utilized to reduce the radiation dose to as low as reasonably

achievable.

            COMPLICATIONS: None



FINDINGS:

LOWER THORAX: Unremarkable.



HEPATOBILIARY:  

No mass.

No biliary dilation.

No calcified gallstone.

SPLEEN: No splenomegaly.     

PANCREAS: No focal masses or ductal dilatation.     

ADRENALS:  No discrete adrenal nodule.

KIDNEYS/URETERS: 

No hydronephrosis, stones, or definite solid mass lesions.    



PELVIC ORGANS/BLADDER: The visualized pelvic organs appear unremarkable.



GI TRACT: 

No dilation or wall thickening identified.

Mild enhancement of the sigmoid mucosa (series 2 image 16).

The appendix is normal.    



PERITONEUM / RETROPERITONEUM: No free air or fluid.

LYMPH NODES: No pathologically enlarged lymph node.

VESSELS: Unremarkable.

BONES and JOINTS:  No aggressive osseous lesion or acute fracture. 

SOFT TISSUES: Unremarkable.





IMPRESSION: 

Findings which could be seen in the setting of a mild ethmoid colitis.



Signed by: Dr. Freddie Cedillo D.O., M.M.M. on 8/1/2019 7:22 PM